# Patient Record
Sex: MALE | Race: BLACK OR AFRICAN AMERICAN | URBAN - METROPOLITAN AREA
[De-identification: names, ages, dates, MRNs, and addresses within clinical notes are randomized per-mention and may not be internally consistent; named-entity substitution may affect disease eponyms.]

---

## 2019-03-01 ENCOUNTER — OFFICE VISIT (OUTPATIENT)
Dept: CARDIOLOGY CLINIC | Age: 79
End: 2019-03-01

## 2019-03-01 VITALS — HEART RATE: 68 BPM | SYSTOLIC BLOOD PRESSURE: 107 MMHG | OXYGEN SATURATION: 95 % | DIASTOLIC BLOOD PRESSURE: 56 MMHG

## 2019-03-01 DIAGNOSIS — I50.9 CONGESTIVE HEART FAILURE, UNSPECIFIED HF CHRONICITY, UNSPECIFIED HEART FAILURE TYPE (HCC): ICD-10-CM

## 2019-03-01 DIAGNOSIS — I25.119 CORONARY ARTERY DISEASE WITH ANGINA PECTORIS, UNSPECIFIED VESSEL OR LESION TYPE, UNSPECIFIED WHETHER NATIVE OR TRANSPLANTED HEART (HCC): Primary | ICD-10-CM

## 2019-03-01 RX ORDER — METOPROLOL TARTRATE 25 MG/1
12.5 TABLET, FILM COATED ORAL
COMMUNITY

## 2019-03-01 RX ORDER — ASPIRIN 81 MG/1
81 TABLET ORAL
COMMUNITY

## 2019-03-01 RX ORDER — ISOSORBIDE MONONITRATE 60 MG/1
60 TABLET, EXTENDED RELEASE ORAL
COMMUNITY

## 2019-03-01 RX ORDER — OMEPRAZOLE 20 MG/1
20 CAPSULE, DELAYED RELEASE ORAL
COMMUNITY
Start: 2013-06-28

## 2019-03-01 RX ORDER — TRAMADOL HYDROCHLORIDE 50 MG/1
50 TABLET ORAL
COMMUNITY
Start: 2013-06-28

## 2019-03-01 RX ORDER — CLOPIDOGREL BISULFATE 75 MG/1
75 TABLET ORAL
COMMUNITY

## 2019-03-01 RX ORDER — ALPRAZOLAM 0.25 MG/1
0.25 TABLET ORAL
COMMUNITY

## 2019-03-01 RX ORDER — CALCIUM ACETATE 667 MG/1
667 CAPSULE ORAL
COMMUNITY

## 2019-03-01 RX ORDER — SENNOSIDES 8.6 MG/1
1 TABLET ORAL
COMMUNITY

## 2019-03-01 RX ORDER — MELATONIN
1000
COMMUNITY

## 2019-03-01 NOTE — PATIENT INSTRUCTIONS
Shana Youssef will call to schedule your testing within 24-48 hours. If you do not hear from her, then please call her directly at [phone]. All testing/lab work is completed in [address] [place] [address]   at [facility] [address]     [facility]  for review

## 2019-03-01 NOTE — PROGRESS NOTES
[facility]    Mr. Dana Gutierrez is a 60-year-old male with a history of coronary artery disease status post CABG in 2000, congestive heart failure, diabetes, hypertension, hyperlipidemia and peripheral artery disease. Patient is here today for cardiac evaluation. He tells me that in 2000, he had a triple-vessel bypass. Details are not available. He was also told that he may have a congestive heart failure. He usually follows up at the [facility] in [place]. Because of lack of sooner appointment, he was referred out for cardiac evaluation. He was told that his legs are swollen when he was trying to fit for his prosthetics. He uses 2- 3 pillows at night. He denies any chest pain or chest tightness. 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 He denies any presyncope or syncope. He denies any palpitation  Denies any nausea, vomiting, abdominal pain, fever, chills, sputum production. No hematuria or other bleeding complaints    Past Medical History:   Diagnosis Date    CAD (coronary artery disease)     Congestive heart failure ([facility])     Diabetes ([facility] [address]     Essential hypertension     Hyperlipidemia     PVD (peripheral vascular disease) ([facility])     B/L Amputation LE     S/P CABG x 3 2000         Past Surgical History:   Procedure Laterality Date    HX CORONARY ARTERY BYPASS GRAFT         Current Outpatient Medications   Medication Sig    omeprazole (PRILOSEC) 20 mg capsule Take 20 mg by mouth.  traMADol (ULTRAM) 50 mg tablet Take 50 mg by mouth.  aspirin delayed-release 81 mg tablet Take 81 mg by mouth.  ALPRAZolam (XANAX) 0.25 mg tablet Take 0.25 mg by mouth.  calcium acetate (PHOSLO) 667 mg cap Take 667 mg by mouth.  cholecalciferol (VITAMIN D3) 1,000 unit tablet Take 1,000 Units by mouth.  metoprolol tartrate (LOPRESSOR) 25 mg tablet Take 12.5 mg by mouth.     senna (SENOKOT) 8.6 mg tablet Take 1 Tab by mouth.  clopidogrel (PLAVIX) 75 mg tab Take 75 mg by mouth.  isosorbide mononitrate ER (IMDUR) 60 mg CR tablet Take 60 mg by mouth. No current facility-administered medications for this visit. Allergies and Sensitivities:  No Known Allergies    Family History:  No family history on file. Social History:  Social History     Tobacco Use    Smoking status: Never Smoker    Smokeless tobacco: Never Used   Substance Use Topics    Alcohol use: No     Frequency: Never    Drug use: Not on file     He  reports that  has never smoked. he has never used smokeless tobacco.  He  reports that he does not drink alcohol. Review of Systems:  Cardiac symptoms as noted above in HPI. All others negative. Denies fatigue, malaise, skin rash, joint pain, blurring vision, photophobia, neck pain, hemoptysis, chronic cough, nausea, vomiting, hematuria, burning micturition, BRBPR, chronic headaches. Physical Exam:  BP Readings from Last 3 Encounters:   03/01/19 107/56         Pulse Readings from Last 3 Encounters:   03/01/19 68          Wt Readings from Last 3 Encounters:   No data found for Wt       Constitutional: Oriented to person, place, and time. HENT: Head: Normocephalic and atraumatic. Eyes: Conjunctivae and extraocular motions are normal.   Neck: No JVD present. Carotid bruit is not appreciated. Cardiovascular: Regular rhythm. No , gallop or rubs appreciated. SUDEEP 2/6 Aortic area  Lung: Breath sounds normal. No respiratory distress. No ronchi or rales appreciated  Abdominal: No tenderness. No rebound and no guarding. Musculoskeletal: There is no lower extremity edema. No cynosis  Lymphadenopathy:  No cervical or supraclavicular adenopathy appriciated. Neurological: No gross motor deficit noted. Skin: No visible skin rash noted. No Ear discharge noted  Psychiatric: Normal mood and affect.    Good distal pulse    Review of Data  LABS:   No results found for: NA, K, CL, CO2, GLU, BUN, CREA  No flowsheet data found. No results found for: GPT, ALT  No results found for: HBA1C, HGBE8, VDH7PXIF, SOD0CEYN    EKG    ECHO    STRESS TEST (EST, PHARM, NUC, ECHO etc)    CATHETERIZATION    IMPRESSION & PLAN:  Mr. Bi Wright is 67-year-old male with a history of coronary artery disease, possible CHF, hypertension, hyperlipidemia, PAD    CAD:  Patient had a possible three-vessel CABG in 2000. Details are not available. He also tells me that he had a coronary stenting a few years after initial CABG. Currently patient denies any symptoms to suggest angina. He is not sure what medication he is taking. He remembers that he is taking aspirin and likely metoprolol. I have asked him to go home and call us back with the list of medication he is taking. Ideally he should be on aspirin, beta-blocker and statin. Hypertension:  Blood pressure today is 107/56 mmHg. He is not sure what medication he is taking. I have advised him to go home and call me with a list of medication  Echo cardia has been ordered because of his CAD and CHF and hypertension    Possible congestive heart failure: On exam he does not appear to have a significant fluid overload. I have ordered echocardiogram to establish ejection fraction in setting of his ongoing symptoms    Hyperlipidemia:   As mentioned, he is not aware of his medication list.  He has been told to go home and call us with list of medication    This plan was discussed with patient who is in agreement. Thank you for allowing me to participate in patient care. Please feel free to call me if you have any question or concern. Rance Sicard, MD  Please note: This document has been produced using voice recognition software. Unrecognized errors in transcription may be present.